# Patient Record
Sex: MALE | Race: WHITE
[De-identification: names, ages, dates, MRNs, and addresses within clinical notes are randomized per-mention and may not be internally consistent; named-entity substitution may affect disease eponyms.]

---

## 2023-08-21 ENCOUNTER — HOSPITAL ENCOUNTER (INPATIENT)
Dept: HOSPITAL 75 - ICU | Age: 48
LOS: 2 days | Discharge: HOME | DRG: 308 | End: 2023-08-23
Attending: INTERNAL MEDICINE | Admitting: INTERNAL MEDICINE
Payer: COMMERCIAL

## 2023-08-21 VITALS — BODY MASS INDEX: 29.99 KG/M2 | WEIGHT: 233.69 LBS | HEIGHT: 73.98 IN

## 2023-08-21 DIAGNOSIS — I11.0: ICD-10-CM

## 2023-08-21 DIAGNOSIS — Z79.01: ICD-10-CM

## 2023-08-21 DIAGNOSIS — I50.21: ICD-10-CM

## 2023-08-21 DIAGNOSIS — I27.20: ICD-10-CM

## 2023-08-21 DIAGNOSIS — I48.91: Primary | ICD-10-CM

## 2023-08-21 DIAGNOSIS — G47.33: ICD-10-CM

## 2023-08-21 PROCEDURE — 36415 COLL VENOUS BLD VENIPUNCTURE: CPT

## 2023-08-21 PROCEDURE — 93005 ELECTROCARDIOGRAM TRACING: CPT

## 2023-08-21 PROCEDURE — 87081 CULTURE SCREEN ONLY: CPT

## 2023-08-21 PROCEDURE — 84443 ASSAY THYROID STIM HORMONE: CPT

## 2023-08-21 PROCEDURE — 85027 COMPLETE CBC AUTOMATED: CPT

## 2023-08-21 PROCEDURE — 83735 ASSAY OF MAGNESIUM: CPT

## 2023-08-21 PROCEDURE — 80053 COMPREHEN METABOLIC PANEL: CPT

## 2023-08-21 PROCEDURE — 85025 COMPLETE CBC W/AUTO DIFF WBC: CPT

## 2023-08-21 PROCEDURE — 84100 ASSAY OF PHOSPHORUS: CPT

## 2023-08-21 PROCEDURE — 93306 TTE W/DOPPLER COMPLETE: CPT

## 2023-08-21 PROCEDURE — 80061 LIPID PANEL: CPT

## 2023-08-21 PROCEDURE — 71045 X-RAY EXAM CHEST 1 VIEW: CPT

## 2023-08-21 RX ADMIN — Medication SCH MLS/HR: at 15:30

## 2023-08-21 RX ADMIN — APIXABAN SCH MG: 5 TABLET, FILM COATED ORAL at 20:56

## 2023-08-21 RX ADMIN — SENNOSIDES SCH MG: 8.6 TABLET, FILM COATED ORAL at 19:26

## 2023-08-21 RX ADMIN — METOPROLOL TARTRATE SCH MG: 25 TABLET, FILM COATED ORAL at 20:56

## 2023-08-21 RX ADMIN — DOCUSATE SODIUM SCH MG: 100 CAPSULE ORAL at 19:26

## 2023-08-21 RX ADMIN — SODIUM CHLORIDE SCH MLS/HR: 900 INJECTION, SOLUTION INTRAVENOUS at 15:30

## 2023-08-21 NOTE — PROGRESS NOTE
DIONTE GLEZ 8/21/23 1448:


Subjective


Date Seen by a Provider:  Aug 21, 2023


Subjective/Events-last exam


Masoud Fragoso is a 48 year old male who came into the hospital today to get lab 

work and imaging done. He has recently begun having shortness of breath and 

friday went to his outpatient provider and was prescribed an albuterol inhaler. 

Over the weekend he continued to be short of breath so he had labs, an EKG, and 

xray ordered. He came into today to do these when he was found to have afib with

RVR. He denies symptoms of chest pain or palpitations and denies ongoing 

shortness of breath. He denies personal history of heart disease, stroke, drug 

use, hypertension, and diabetes. He admits to snoring when he sleeps. He does 

admit to a family history of heart disease in several family members.


Review of Systems


Pulmonary:  No Dyspnea


Cardiovascular:  No: Chest Pain, Palpitations, Edema


Negative except as mentioned in HPI.





Objective


Exam


Last Set of Vital Signs





Vital Signs








  Date Time  Temp Pulse Resp B/P (MAP) Pulse Ox O2 Delivery O2 Flow Rate FiO2


 


8/21/23 14:30  82 29 120/96 (104) 93 Room Air  





Capillary Refill :


General:  Alert, Cooperative, No Acute Distress


HEENT:  Atraumatic


Lungs:  Clear to Auscultation, Normal Air Movement


Heart:  Other (tachycardic, irregularly irregular rhythm)


Extremities:  No Edema, Normal Pulses


Neuro:  Normal Speech


Psych/Mental Status:  Mental Status NL, Mood NL





Assessment/Plan


Assessment/Plan


Assess & Plan/Chief Complaint


Assessment and Plan


-Atrial fibrillation with RVR


   > Order CMP for electrolyte levels (K, Mg)


   > Transthoracic echo to assess LA size, thrombus formation, LV function, 

valvular dysfunction


   > Continue rate control with diltiazem


      > If rate control with CCB is not sufficient consider cardioversion


   > Continue anticoagulation with apixaban


   > Repeat EKG


   > [id number] score of 0





TASHA MARIN DO 8/22/23 0455:


Subjective


Time Seen by a Provider:  18:00





Supervisory-Addendum Brief


Verification & Attestation


Participated in pt care:  history, MDM, physical


Personally performed:  exam, history, MDM, supervision of care


Care discussed with:  Medical Student


Procedures:  n/a


Results interpretation:  Verified all documentation


Verification and Attestation of Medical Student E/M Service





A medical student performed and documented this service in my presence. I 

reviewed and verified all information documented by the medical student and made

modifications to such information, when appropriate. I personally performed the 

physical exam and medical decision making. 





 Tasha Marin, Aug 22, 2023,04:54











DIONTE GLEZ                Aug 21, 2023 14:48


TASHA MARIN DO                Aug 22, 2023 04:55

## 2023-08-21 NOTE — CONSULTATION-CARDIOLOGY
HPI-Cardiology


Cardiology Consultation


Date of Consultation


8/21/23


Date of Admission





Time Seen by Provider:  17:23


Indication:  Atrial fibrillation





HPI


48-year-old gentleman with no significant past medical history starting to have 

increasing dyspnea for the past week, seen by Dr. Molina, started the work-up, he

was seen in the emergency room with atrial fibrillation rapid ventricular 

response, transferred over with persistent tachycardia.


On my evaluation he was feeling slightly better, still anxious.  Denied any 

chest pain.  No syncope or near syncopal episodes.  No claudications.





Home Medications & Allergies


Allergies:  


Coded Allergies:  


     No Known Drug Allergies (Unverified , 12/10/15)


Home Medication List Reviewed:  Yes


Did not take any medication at home





PMH-Social-Family Hx


Patient Social History


Marital Status:  


Employed/Student:  employed


Smoking Status:  Never a Smoker


Alcohol Use?:  Yes





Past Medical History


Discussed below





Family Medical History


Significant Family History:  No Pertinent Family Hx





Review of Systems-General


Review of Systems


Constitutional:  no symptoms reported, see HPI


EENTM:  see HPI, no symptoms reported


Respiratory:  see HPI; No cough; dyspnea on exertion; No hemoptysis, No 

orthopnea, No phlegm, No short of breath, No stridor, No wheezing, No other


Cardiovascular:  see HPI


Gastrointestinal:  no symptoms reported, see HPI


Genitourinary:  no symptoms reported, see HPI


Musculoskeletal:  no symptoms reported, see HPI


Skin:  no symptoms reported, see HPI


Psychiatric/Neurological:  No Symptoms Reported, See HPI





Physical Exam


Physical Exam


Vital Signs





Vital Signs - First Documented








 8/21/23 8/21/23





 12:45 13:45


 


Pulse 104 


 


Resp  29


 


B/P (MAP) 106/90 (95) 


 


Pulse Ox 99 


 


O2 Delivery Room Air 





Capillary Refill :


Height, Weight, BMI


Height: '"


Weight: lbs. oz. kg; 30.13 BMI


Method:


General Appearance:  No Apparent Distress, WD/WN


Eyes:  Bilateral Eye Normal Inspection, Bilateral Eye PERRL, Bilateral Eye EOMI


HEENT:  PERRL/EOMI, TMs Normal, Normal ENT Inspection, Pharynx Normal, Moist 

Mucous Membranes


Neck:  Full Range of Motion, Normal Inspection, Non Tender, Supple, Carotid 

Bruit


Respiratory:  Chest Non Tender, Normal Breath Sounds, No Accessory Muscle Use, 

No Respiratory Distress


Cardiovascular:  No Edema, No Gallop, No JVD, No Murmur, Normal Peripheral 

Pulses, Systolic Murmur, Irregularly Irregular, Tachycardia


Gastrointestinal:  Normal Bowel Sounds, No Organomegaly, No Pulsatile Mass, Non 

Tender, Soft


Back:  Normal Inspection, No CVA Tenderness, No Vertebral Tenderness


Extremity:  Normal Capillary Refill, Normal Inspection, Normal Range of Motion, 

Non Tender, No Calf Tenderness, No Pedal Edema


Neurologic/Psychiatric:  Alert, Oriented x3, No Motor/Sensory Deficits, Normal 

Mood/Affect


Skin:  Normal Color, Warm/Dry


Lymphatic:  No Adenopathy





A/P-Cardiology


Admission Diagnosis


Atrial fibrillation


Tachycardia


Congestive heart failure acute left ventricular systolic dysfunction


Hypertension





Assessment/Plan


Atrial fibrillation with rapid ventricular response


Unknown etiology


Started on Cardizem drip, still tachycardic


I will add digoxin and metoprolol


Currently on Lovenox


Planning for RALPH and electrical cardioversion if he did not convert on his own.





Congestive heart failure, acute left ventricular systolic dysfunction


2D echo was done on August 21, 2023 with dilated left ventricle diffuse 

hypokinesia with ejection fraction 40 to 45%, biatrial enlargement, pulmonary 

hypertension with PA pressure 45 to 50 mmHg


Underlying etiology is unknown probably due to atrial fibrillation


Adding beta-blockers and continue on Cardizem for now and monitor


I am planning to add losartan or Entresto depends on his blood pressure in 

addition to Jardiance once clinically stable





CHADVASC score 2, starting Eliquis 5 mg twice daily after the cardioversion





Hypertension, monitor blood pressure while on current medication





Mild to moderate consumption of alcohol, educated on avoiding alcohol





Pulmonary hypertension, PA pressure 45 to 50 mmHg, unknown etiology


Questionable underlying sleep apnea











MOIRA SUAREZ MD              Aug 21, 2023 17:27

## 2023-08-22 VITALS — DIASTOLIC BLOOD PRESSURE: 86 MMHG | SYSTOLIC BLOOD PRESSURE: 93 MMHG

## 2023-08-22 LAB
ALBUMIN SERPL-MCNC: 3.6 GM/DL (ref 3.2–4.5)
ALP SERPL-CCNC: 61 U/L (ref 40–136)
ALT SERPL-CCNC: 40 U/L (ref 0–55)
BASOPHILS # BLD AUTO: 0 10^3/UL (ref 0–0.1)
BASOPHILS NFR BLD AUTO: 1 % (ref 0–10)
BILIRUB SERPL-MCNC: 0.7 MG/DL (ref 0.1–1)
BUN/CREAT SERPL: 16
CALCIUM SERPL-MCNC: 8.4 MG/DL (ref 8.5–10.1)
CHLORIDE SERPL-SCNC: 108 MMOL/L (ref 98–107)
CHOLEST SERPL-MCNC: 148 MG/DL (ref ?–200)
CO2 SERPL-SCNC: 25 MMOL/L (ref 21–32)
CREAT SERPL-MCNC: 0.95 MG/DL (ref 0.6–1.3)
EOSINOPHIL # BLD AUTO: 0.1 10^3/UL (ref 0–0.3)
EOSINOPHIL NFR BLD AUTO: 1 % (ref 0–10)
GFR SERPLBLD BASED ON 1.73 SQ M-ARVRAT: 100 ML/MIN
GLUCOSE SERPL-MCNC: 96 MG/DL (ref 70–105)
HCT VFR BLD CALC: 41 % (ref 40–54)
HDLC SERPL-MCNC: 36 MG/DL (ref 40–60)
HGB BLD-MCNC: 13.2 G/DL (ref 13.3–17.7)
LYMPHOCYTES # BLD AUTO: 2 10^3/UL (ref 1–4)
LYMPHOCYTES NFR BLD AUTO: 28 % (ref 12–44)
MAGNESIUM SERPL-MCNC: 1.9 MG/DL (ref 1.6–2.4)
MANUAL DIFFERENTIAL PERFORMED BLD QL: NO
MCH RBC QN AUTO: 30 PG (ref 25–34)
MCHC RBC AUTO-ENTMCNC: 33 G/DL (ref 32–36)
MCV RBC AUTO: 91 FL (ref 80–99)
MONOCYTES # BLD AUTO: 0.7 10^3/UL (ref 0–1)
MONOCYTES NFR BLD AUTO: 10 % (ref 0–12)
NEUTROPHILS # BLD AUTO: 4.5 10^3/UL (ref 1.8–7.8)
NEUTROPHILS NFR BLD AUTO: 61 % (ref 42–75)
PHOSPHATE SERPL-MCNC: 3.6 MG/DL (ref 2.3–4.7)
PLATELET # BLD: 196 10^3/UL (ref 130–400)
PMV BLD AUTO: 10.3 FL (ref 9–12.2)
POTASSIUM SERPL-SCNC: 4.2 MMOL/L (ref 3.6–5)
PROT SERPL-MCNC: 5.8 GM/DL (ref 6.4–8.2)
SODIUM SERPL-SCNC: 141 MMOL/L (ref 135–145)
TRIGL SERPL-MCNC: 107 MG/DL (ref ?–150)
VLDLC SERPL CALC-MCNC: 21 MG/DL (ref 5–40)
WBC # BLD AUTO: 7.4 10^3/UL (ref 4.3–11)

## 2023-08-22 PROCEDURE — 5A2204Z RESTORATION OF CARDIAC RHYTHM, SINGLE: ICD-10-PCS | Performed by: INTERNAL MEDICINE

## 2023-08-22 RX ADMIN — EMPAGLIFLOZIN SCH MG: 10 TABLET, FILM COATED ORAL at 09:31

## 2023-08-22 RX ADMIN — POTASSIUM CHLORIDE SCH MEQ: 1500 TABLET, EXTENDED RELEASE ORAL at 05:13

## 2023-08-22 RX ADMIN — DOCUSATE SODIUM SCH MG: 100 CAPSULE ORAL at 09:00

## 2023-08-22 RX ADMIN — POTASSIUM CHLORIDE SCH MLS/HR: 200 INJECTION, SOLUTION INTRAVENOUS at 05:13

## 2023-08-22 RX ADMIN — METOPROLOL TARTRATE SCH MG: 25 TABLET, FILM COATED ORAL at 20:42

## 2023-08-22 RX ADMIN — SACUBITRIL AND VALSARTAN SCH TAB: 24; 26 TABLET, FILM COATED ORAL at 09:30

## 2023-08-22 RX ADMIN — MAGNESIUM SULFATE IN DEXTROSE SCH MLS/HR: 10 INJECTION, SOLUTION INTRAVENOUS at 05:36

## 2023-08-22 RX ADMIN — APIXABAN SCH MG: 5 TABLET, FILM COATED ORAL at 09:30

## 2023-08-22 RX ADMIN — Medication SCH MLS/HR: at 00:47

## 2023-08-22 RX ADMIN — SODIUM CHLORIDE SCH MLS/HR: 900 INJECTION, SOLUTION INTRAVENOUS at 05:12

## 2023-08-22 RX ADMIN — MAGNESIUM SULFATE IN DEXTROSE SCH MLS/HR: 10 INJECTION, SOLUTION INTRAVENOUS at 05:37

## 2023-08-22 RX ADMIN — SACUBITRIL AND VALSARTAN SCH TAB: 24; 26 TABLET, FILM COATED ORAL at 20:42

## 2023-08-22 RX ADMIN — SENNOSIDES SCH MG: 8.6 TABLET, FILM COATED ORAL at 20:44

## 2023-08-22 RX ADMIN — SENNOSIDES SCH MG: 8.6 TABLET, FILM COATED ORAL at 09:00

## 2023-08-22 RX ADMIN — DOCUSATE SODIUM SCH MG: 100 CAPSULE ORAL at 20:44

## 2023-08-22 RX ADMIN — MAGNESIUM SULFATE IN DEXTROSE SCH MLS/HR: 10 INJECTION, SOLUTION INTRAVENOUS at 05:14

## 2023-08-22 RX ADMIN — SODIUM CHLORIDE SCH MLS/HR: 900 INJECTION, SOLUTION INTRAVENOUS at 20:42

## 2023-08-22 RX ADMIN — DEXTROSE SCH MLS/HR: 5 SOLUTION INTRAVENOUS at 09:00

## 2023-08-22 RX ADMIN — APIXABAN SCH MG: 5 TABLET, FILM COATED ORAL at 20:42

## 2023-08-22 RX ADMIN — LORATADINE SCH MG: 10 TABLET ORAL at 09:30

## 2023-08-22 RX ADMIN — DEXTROSE SCH MLS/HR: 5 SOLUTION INTRAVENOUS at 16:12

## 2023-08-22 RX ADMIN — METOPROLOL TARTRATE SCH MG: 25 TABLET, FILM COATED ORAL at 09:30

## 2023-08-22 NOTE — PROGRESS NOTE
DIONTE GLEZ F 8/22/23 1337:


Subjective


Subjective/Events-last exam


Mr. Fragoso was seen this morning and he appears to be doing well. He said he feels

good. He denies chest pain, shortness of breath, palpitations.


Review of Systems


General:  No Chills, No Night Sweats, No Fatigue, No Malaise, No Appetite, No 

Other


HEENT:  No Head Aches, No Visual Changes, No Eye Pain, No Ear Pain, No 

Dysphasia, No Sinus Congestion, No Post Nasal Drip, No Sore Throat, No Other


Pulmonary:  No Dyspnea, No Cough, No Pleuritic Chest Pain


Cardiovascular:  No: Chest Pain, Palpitations


Negative except as mentioned in HPI.





Objective


Exam


Last Set of Vital Signs





Vital Signs








  Date Time  Temp Pulse Resp B/P (MAP) Pulse Ox O2 Delivery O2 Flow Rate FiO2


 


8/22/23 12:43  63      


 


8/22/23 12:00   20 97/85 (89) 90 Room Air  


 


8/22/23 12:00 36.2       


 


8/21/23 21:54        21





Capillary Refill : Less Than 3 Seconds


I&O











Intake and Output 


 


 8/22/23





 00:00


 


Intake Total 1600 ml


 


Output Total 850 ml


 


Balance 750 ml


 


 


 


Intake Oral 1600 ml


 


Output Urine Total 850 ml


 


Daily Weight Change No








General:  Alert


Neck:  Supple


Lungs:  Clear to Auscultation, Normal Air Movement


Heart:  Regular Rate, Normal S1, Normal S2, No Murmurs





Results


Lab


Laboratory Tests


8/22/23 03:33: 


White Blood Count 7.4, Red Blood Count 4.43, Hemoglobin 13.2L, Hematocrit 41, 

Mean Corpuscular Volume 91, Mean Corpuscular Hemoglobin 30, Mean Corpuscular 

Hemoglobin Concent 33, Red Cell Distribution Width 13.4, Platelet Count 196, 

Mean Platelet Volume 10.3, Immature Granulocyte % (Auto) 0, Neutrophils (%) 

(Auto) 61, Lymphocytes (%) (Auto) 28, Monocytes (%) (Auto) 10, Eosinophils (%) 

(Auto) 1, Basophils (%) (Auto) 1, Neutrophils # (Auto) 4.5, Lymphocytes # (Auto)

2.0, Monocytes # (Auto) 0.7, Eosinophils # (Auto) 0.1, Basophils # (Auto) 0.0, 

Immature Granulocyte # (Auto) 0.0, Sodium Level 141, Potassium Level 4.2, 

Chloride Level 108H, Carbon Dioxide Level 25, Anion Gap 8, Blood Urea Nitrogen 

15, Creatinine 0.95, Estimat Glomerular Filtration Rate 100, BUN/Creatinine 

Ratio 16, Glucose Level 96, Calcium Level 8.4L, Corrected Calcium 8.7, 

Phosphorus Level 3.6, Magnesium Level 1.9, Total Bilirubin 0.7, Aspartate Amino 

Transf (AST/SGOT) 22, Alanine Aminotransferase (ALT/SGPT) 40, Alkaline 

Phosphatase 61, Total Protein 5.8L, Albumin 3.6, Triglycerides Level 107, 

Cholesterol Level 148, LDL Cholesterol Direct 114, VLDL Cholesterol 21, HDL 

Cholesterol 36L, Thyroid Stimulating Hormone (TSH) 0.81





Microbiology


8/21/23 MRSA Screen - Final, Complete


          MRSA not isolated





Assessment/Plan


Assessment/Plan


Assess & Plan/Chief Complaint


Assessment and Plan


-Atrial fibrillation with RVR


   > Cardizem drip started yesterday but was still slightly tachycardic


   > Cardiology added digoxin and metoprolol


      > HR under control at 78 this morning


   > had 2D echo yesterday that showed EF of 40-45% with dilated LV, biatrial 

enlargement, pulmonary HTN with PA pressure 45-50 mmHg (likely hx of sleep 

apnea)


   > RALPH showed no signs of thrombus formation


   > cardioversion x2 unsuccessful


   > lopressor given, amiodarone drip planned


   > Continue anticoagulation


   > Consider possibly d/c cardizem drip due to normalized HR and hypotension





TASHA MARIN DO 8/22/23 1937:


Supervisory-Addendum Brief


Verification & Attestation


Participated in pt care:  history, MDM, physical


Personally performed:  exam, history, MDM, supervision of care


Care discussed with:  Medical Student


Procedures:  n/a


Results interpretation:  Verified all documentation


Verification and Attestation of Medical Student E/M Service





A medical student performed and documented this service in my presence. I 

reviewed and verified all information documented by the medical student and made

modifications to such information, when appropriate. I personally performed the 

physical exam and medical decision making. 





 Tasha Marin, Aug 22, 2023,19:37











DIONTE GLEZ                Aug 22, 2023 13:37


TASHA MARIN DO                Aug 22, 2023 19:37

## 2023-08-22 NOTE — PROGRESS NOTE - HOSPITALIST
DIONTE GLEZ 8/22/23 1210:


Subjective


HPI/CC On Admission


Late entry of H&P: student mistakenly created progress note instead of H&P. 

Patient was seen 8/21/23





CC: New on set AF RVR





HPI: This is a 48yoWM clinic patient of Dr Molina who has essentially no PMH and 

does not smoke or drink etoh who presented from [facility] for higher level of care 

due to new onset AF RVR. Cardizem drip initiated and OAC and Dr Schwarz plans to 

perform RALPH and cardiovert tomorrow. No sleep study performed in the past and I 

recommended that after DC. 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


Subjective/Events-last exam


Masoud Fragoso was seen this morning and appeared to be doing well. He said he 

feels good. Denies chest pain, shortness of breath, palpitations





Review of Systems


General:  No Chills, No Night Sweats, No Fatigue, No Malaise, No Appetite, No 

Other


HEENT:  No Head Aches, No Visual Changes, No Eye Pain, No Ear Pain, No 

Dysphasia, No Sinus Congestion, No Post Nasal Drip, No Sore Throat, No Other


Pulmonary:  No Dyspnea, No Pleuritic Chest Pain


Cardiovascular:  No: Chest Pain, Palpitations


Gastrointestinal:  No: Nausea, Vomiting, Abdominal Pain, Diarrhea, Constipation,

Melena, Hematochezia, Other


Genitourinary:  No Dysuria, No Frequency, No Incontinence, No Hematuria, No 

Retention, No Other


Musculoskeletal:  No: other, neck pain, shoulder pain, arm pain, back pain, hand

pain, leg pain, foot pain


Neurological:  No: Weakness, Numbness, Incoordination, Change in speech, 

Confusion, Seizures, Other


Negative except as mentioned in HPI.





Objective


Exam


Vital Signs





Vital Signs








  Date Time  Temp Pulse Resp B/P (MAP) Pulse Ox O2 Delivery O2 Flow Rate FiO2


 


8/22/23 11:00  74 9 124/103 (110) 97 Room Air  


 


8/22/23 08:00 36.3       


 


8/21/23 21:54        21





Capillary Refill : Less Than 3 Seconds


General Appearance:  No Apparent Distress, WD/WN


Neck:  Normal Inspection


Respiratory:  Lungs Clear, Normal Breath Sounds; No Respiratory Distress


Cardiovascular:  Regular Rate, Rhythm, No Murmur, Normal Peripheral Pulses





Results/Procedures


Lab


Laboratory Tests


8/22/23 03:33








Patient resulted labs reviewed.





Assessment/Plan


Assessment and Plan


Assess & Plan/Chief Complaint


Assessment and Plan


-Atrial fibrillation with RVR


   > started on cardizem drip yesterday, was still slightly tachycardic


   > cardiology added digoxin and metoprolol


   > tachycardia resolved today with HR 78


   > 2D echo yesterday showed EF of 40-45% with dilated LV, biatrial 

enlargement, pulmonary HTN with PA pressure 45-50 mmHg (questionable sleep 

apnea)


   > RALPH showed no thrombus formation


   > cardioversion x2 was unsuccessful


   > lopressor given and plan amiodarone drip


   > consider d/c cardizem and continue metoprolol since HR is controlled but 

patient BP is 93/86 this morning





WHEAT,JIM 8/22/23 1318:








DIONTE GLEZ                Aug 22, 2023 12:10


WHEAT,JIM                      Aug 22, 2023 13:18

## 2023-08-22 NOTE — ANESTHESIA-GENERAL POST-OP
MAC


Patient Condition


Mental Status/LOC:  Same as Preop


Cardiovascular:  Satisfactory


Nausea/Vomiting:  Absent


Respiratory:  Satisfactory


Pain:  Controlled


Complications:  Absent





Post Op Complications


Complications


None





Follow Up Care/Instructions


Patient Instructions


None needed.





Anesthesiology Discharge Order


Discharge Order


Patient is doing well, no complaints, stable vital signs, no apparent adverse 

anesthesia problems.   


No complications reported per nursing.











JANELL DEVI CRNA         Aug 22, 2023 08:48

## 2023-08-22 NOTE — CARDIOLOGY PROGRESS NOTE
Subjective


Date Seen by Provider:  Aug 22, 2023


Time Seen by Provider:  07:53


Subjective/Events-last exam


Patient was seen at bedside sitting comfortably.  No new complain


Review of Systems


General:  No Chills, No Night Sweats, No Fatigue, No Malaise, No Appetite, No 

Other


HEENT:  No Head Aches, No Visual Changes, No Eye Pain, No Ear Pain, No 

Dysphasia, No Sinus Congestion, No Post Nasal Drip, No Sore Throat, No Other


Pulmonary:  No Dyspnea, No Cough, No Pleuritic Chest Pain, No Other


Cardiovascular:  No: Chest Pain, Palpitations, Orthopnea, Paroxysmal Noc. 

Dyspnea, Edema, Lt Headedness, Other





Objective-Cardiology


Exam


Last Set of Vital Signs





Vital Signs








 8/21/23 8/21/23 8/22/23 8/22/23 8/22/23





 21:54 22:45 05:00 06:00 07:12


 


Temp  36.5   


 


Pulse     75


 


Resp   20  


 


B/P (MAP)    105/69 (81) 


 


Pulse Ox    90 


 


O2 Delivery    Room Air 


 


FiO2 21    








I&O











Intake and Output 


 


 8/22/23





 00:00


 


Intake Total 1600 ml


 


Output Total 850 ml


 


Balance 750 ml


 


 


 


Intake Oral 1600 ml


 


Output Urine Total 850 ml


 


Daily Weight Change No








General:  Alert, Cooperative, No Acute Distress


HEENT:  Atraumatic


Neck:  Supple, No JVD


Lungs:  Clear to Auscultation, Normal Air Movement


Heart:  Normal S1, Normal S2, Other (tachycardic, irregularly irregular rhythm)


Abdomen:  Normal Bowel Sounds


Extremities:  No Clubbing, No Cyanosis, No Edema, Normal Pulses


Skin:  No Rashes, No Breakdown


Neuro:  Normal Speech


Psych/Mental Status:  Mental Status NL, Mood NL





Results


Lab


Laboratory Tests


8/22/23 03:33














A/P-Cardiology


Admission Diagnosis


Atrial fibrillation


Tachycardia


Congestive heart failure acute left ventricular systolic dysfunction


Hypertension





Assessment/Plan


Atrial fibrillation with rapid ventricular response


Unknown etiology


Currently on Cardizem drip, had multiple episodes of bradycardia but did not 

convert overnight


Plan to proceed with RALPH and electrical cardioversion





Congestive heart failure, acute left ventricular systolic dysfunction


2D echo was done on August 21, 2023 with dilated left ventricle diffuse 

hypokinesia with ejection fraction 40 to 45%, biatrial enlargement, pulmonary 

hypertension with PA pressure 45 to 50 mmHg


Underlying etiology is unknown probably due to atrial fibrillation


Adding beta-blockers and continue on Cardizem for now and monitor


I am planning to add losartan or Entresto depends on his blood pressure in 

addition to Jardiance once clinically stable





CHADVASC score 2, starting Eliquis 5 mg twice daily after the cardioversion





Hypertension, monitor blood pressure while on current medication





Mild to moderate consumption of alcohol, educated on avoiding alcohol





Pulmonary hypertension, PA pressure 45 to 50 mmHg, unknown etiology


Questionable underlying sleep apnea











MOIRA SUAREZ MD              Aug 22, 2023 07:54

## 2023-08-22 NOTE — HISTORY & PHYSICAL
History of Present Illness


HPI/Chief Complaint


Late entry of H&P: student mistakenly created progress note instead of H&P. 

Patient was seen 8/21/23





CC: New on set AF RVR





HPI: This is a 48yoWM clinic patient of Dr Molina who has essentially no PMH and 

does not smoke or drink etoh who presented from [facility] for higher level of care 

due to new onset AF RVR. Cardizem drip initiated and OAC and Dr Schwarz plans to 

perform AMAYA and cardiovert tomorrow. No sleep study performed in the past and I 

recommended that after DC. 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


Source:  patient, family, RN/MD, old records


Exam Limitations:  no limitations


Date Seen


8/22/23


Time Seen by a Provider:  19:00


Attending Physician


Kirill Eason MD


PCP


Admitting Physician:


Tasha Marin DO 








Attending Physician:


Tasha Marin DO


Referring Physician





Date of Admission


Aug 21, 2023 at 12:40





Home Medications & Allergies


Home Medications


Reviewed patient Home Medication Reconciliation performed by pharmacy medication

reconciliations technician and/or nursing.


Patients Allergies have been reviewed.





Allergies





Allergies


Coded Allergies


  No Known Drug Allergies ([id number]/15)








Past Medical-Social-Family Hx


Past Med/Social Hx:  Reviewed Nursing Past Med/Soc Hx, Reviewed and Corrections 

made


Patient Social History


Marrital Status:  


Employed/Student:  employed


Smoking Status:  Never a Smoker





Past Medical History


Cardiac:  Atrial Fibrillation (8/21/23)





Family History


No Pertinent Family Hx





Review of Systems


Constitutional:  see HPI


Respiratory:  dyspnea on exertion, short of breath


Cardiovascular:  palpitations





Physical Exam


Physical Exam


Vital Signs





Vital Signs - First Documented








 8/21/23 8/21/23 8/21/23 8/21/23





 12:45 13:45 19:25 21:54


 


Temp   36.6 


 


Pulse 104   


 


Resp  29  


 


B/P (MAP) 106/90 (95)   


 


Pulse Ox 99   


 


O2 Delivery Room Air   


 


FiO2    21





Capillary Refill : Less Than 3 Seconds


Height, Weight, BMI


Height: '"


Weight: lbs. oz. kg; 30.13 BMI


Method:


General Appearance:  No Apparent Distress, WD/WN


Eyes:  Bilateral Eye Normal Inspection, Bilateral Eye PERRL, Bilateral Eye EOMI


HEENT:  PERRL/EOMI, TMs Normal, Normal ENT Inspection, Pharynx Normal, Moist 

Mucous Membranes


Neck:  Full Range of Motion, Normal Inspection, Non Tender, Supple, Carotid B

ruit


Respiratory:  Chest Non Tender, Normal Breath Sounds, No Accessory Muscle Use, 

No Respiratory Distress


Cardiovascular:  No Edema, No Gallop, No JVD, No Murmur, Normal Peripheral 

Pulses, Systolic Murmur, Irregularly Irregular, Tachycardia


Gastrointestinal:  Normal Bowel Sounds, No Organomegaly, No Pulsatile Mass, Non 

Tender, Soft


Back:  Normal Inspection, No CVA Tenderness, No Vertebral Tenderness


Extremity:  Normal Capillary Refill, Normal Inspection, Normal Range of Motion, 

Non Tender, No Calf Tenderness, No Pedal Edema


Neurologic/Psychiatric:  Alert, Oriented x3, No Motor/Sensory Deficits, Normal 

Mood/Affect


Skin:  Normal Color, Warm/Dry


Lymphatic:  No Adenopathy





Results


Results/Procedures


Labs


Laboratory Tests


8/22/23 03:33








Patient resulted labs reviewed.





Assessment/Plan


Admission Diagnosis


Assessment:


New onset AF RVR


Suspected sleep study





Plan:


Cardizem drip


Cardioversion after AMAYA tomorrow


OAC


Admission Status:  Inpatient Order (span 2 midnights)


Reason for Inpatient Admission:  


amaya af rvr











TASHA MARIN DO                Aug 22, 2023 04:59

## 2023-08-22 NOTE — TELE-ICU PROGRESS NOTE
Subjective


Date Seen by a Provider:  Aug 22, 2023


Time Seen by a Provider:  10:37


Subjective/Events-last exam





(Tele-ICU Physician ,   consultation as per request of PCP 


Service provided via interactive audio and video telecommunications  E-CARE 

system to a patient admitted to ICU bed in [facility].








Available chart/ vitals / labs / Images reviewed


H&P is from ER notes


Patient's information available about PMH, Shx, Fhx   allergy reviewed inEMR.


ROS as per chart and RN report





Now in ICU, hemodynamically stable


Video assessment done using   teleICU camera, rest of exam as per RN


Discussed with RN.








Hospital course:


(8/21) 48M Admitted as transfer from [place] ER for afib RVR-digoxin/cardizem


(8/22) Did not convert overnight. RALPH shoed no clot or thrombus. Cardioversion 

done-unsuccessful x2. Lopressor given. Plan for amio drip








A/P


Afin RVR - new 


Unknown etiology


-on Cardizem drip,


- 8/22 RALPH  no  thrombus, EF 45 % 


-s/p atterempted Cardioversion -unsuccessful x2. 


- amio drip as per cards ordered 


- ac -Lovenox








Acute left ventricular systolic dysfunction


- as per cards 





Pulmonary hypertension, P


A pressure 45 to 50 mmHg on previous echo 


-  unknown etiology, needs f/up and additional w/up - NEED CTA TO R?O CHRONIC VS

ACUTE PE ? - as per bedside MD 








VTE Prophylaxis: lov


Stress Ulcer Prophylaxis: na








Plans in collaboration with   bedside consultants and IM MDs.


Discussed with RN to reach out if any questions or concerns


A total of  22 minutes of critical care time was devoted to this patient today, 

required to treat and/or prevent further deterioration of  critical care 

condition ( as above ) .





I am remotely monitoring this patient from another state.  I am unable to do the

bedside exam, and history/physical and pertinent information is taken from other

notes in the computer and bedside staff. .











Sepsis Event


Evaluation


Height, Weight, BMI


Height: '"


Weight: lbs. oz. kg; 30.47 BMI


Method:





Exam


Exam


Patient acknowledged, consented, and participated in this virtual visit which 

was conducted using real time audio/video


Vital Signs








  Date Time  Temp Pulse Resp B/P (MAP) Pulse Ox O2 Delivery O2 Flow Rate FiO2


 


8/22/23 10:00  74 37 137/123 (128) 96 Room Air  


 


8/22/23 09:45  73 39 138/122 (127) 93 Room Air  


 


8/22/23 09:30  80 20 132/117 (122) 96 Room Air  


 


8/22/23 09:15  79 12 134/105 (115) 92 Room Air  


 


8/22/23 09:01  78  93/86    


 


8/22/23 09:00  82 12 131/107 (115) 93 Room Air  


 


8/22/23 08:30  78 15 93/86 (88) 93 Room Air  


 


8/22/23 08:20  73 37 121/84 (96) 94 Room Air  


 


8/22/23 08:15  84 24 122/87 (99) 98 Room Air  


 


8/22/23 08:10  68 16 127/76 (93) 99 Room Air  


 


8/22/23 08:05  73 31 114/83 (93) 99 Room Air  


 


8/22/23 08:00  87 19 127/76 (93) 100 Room Air  


 


8/22/23 08:00 36.3       


 


8/22/23 07:12  75      


 


8/22/23 07:00  77 21 127/89 (102) 93 Room Air  


 


8/22/23 06:00  76  105/69 (81) 90 Room Air  


 


8/22/23 05:00  65 20 120/96 (104) 89 Room Air  


 


8/22/23 04:00  69 26 127/89 (102) 89 Room Air  


 


8/22/23 03:00  61  111/82 (92) 92 Room Air  


 


8/22/23 03:00     91 Room Air  


 


8/22/23 02:00  60  105/79 (88) 89 Room Air  


 


8/22/23 01:00  40      


 


8/22/23 01:00  67  110/85 (93) 92 Room Air  


 


8/22/23 00:47  66  111/84    


 


8/22/23 00:00  72 35 113/90 (98) 94 Room Air  


 


8/21/23 23:15  65  111/87 (95) 89 Room Air  


 


8/21/23 23:00     96 Room Air  


 


8/21/23 22:45 36.5 60 22 106/89 (95) 95 Room Air  


 


8/21/23 22:00  68  120/86 (97) 93 Room Air  


 


8/21/23 21:54     95 Room Air  21


 


8/21/23 21:15  74 23 127/88 (101) 94 Room Air  


 


8/21/23 20:00  79 39 126/96 (106) 95 Room Air  


 


8/21/23 19:39 36.6       


 


8/21/23 19:33        


 


8/21/23 19:25 36.6       


 


8/21/23 19:20     95 Room Air  


 


8/21/23 19:00  65 20 111/89 (96) 95 Room Air  


 


8/21/23 19:00  75      


 


8/21/23 18:00  101 16 120/93 (102) 94 Room Air  


 


8/21/23 17:00  111 31 133/107 (116) 95 Room Air  


 


8/21/23 16:00  96 18 112/92 (99) 91 Room Air  


 


8/21/23 15:46     98 Room Air  


 


8/21/23 15:30  110      


 


8/21/23 15:00  94 11 124/91 (102) 95 Room Air  


 


8/21/23 14:45  99 44 124/97 (106) 95 Room Air  


 


8/21/23 14:30  82 29 120/96 (104) 93 Room Air  


 


8/21/23 14:15  109 22 111/95 (100) 94 Room Air  


 


8/21/23 14:00  98 15 109/94 (99) 91 Room Air  


 


8/21/23 13:45  94 29 115/94 (101) 93 Room Air  


 


8/21/23 13:30  104  113/93 (100) 90 Room Air  


 


8/21/23 13:15  97  118/100 (106) 96 Room Air  


 


8/21/23 13:00  102  112/105 (107) 91 Room Air  


 


8/21/23 12:58     98 Room Air  


 


8/21/23 12:45  89  106/90 (95) 99 Room Air  


 


8/21/23 12:45  104      














I & O 


 


 8/22/23





 06:59


 


Intake Total 3125 ml


 


Output Total 3000 ml


 


Balance 125 ml








Height & Weight


Height: '"


Weight: lbs. oz. kg; 30.47 BMI


Method:


General Appearance:  No Apparent Distress, WD/WN


HEENT:  PERRL/EOMI, TMs Normal, Normal ENT Inspection, Pharynx Normal, Moist 

Mucous Membranes


Neck:  Full Range of Motion, Normal Inspection, Non Tender, Supple, Carotid 

Bruit


Respiratory:  Chest Non Tender, Normal Breath Sounds, No Accessory Muscle Use, 

No Respiratory Distress


Cardiovascular:  No Edema, No Gallop, No JVD, No Murmur, Normal Peripheral 

Pulses, Systolic Murmur, Irregularly Irregular, Tachycardia


Capillary Refill:  Less Than 3 Seconds


Extremity:  Normal Capillary Refill, Normal Inspection, Normal Range of Motion, 

Non Tender, No Calf Tenderness, No Pedal Edema


Neurologic/Psychiatric:  Alert, Oriented x3, No Motor/Sensory Deficits, Normal 

Mood/Affect


Skin:  Normal Color, Warm/Dry


Lymphatic:  No Adenopathy





Results


Lab


Laboratory Tests


8/22/23 03:33











Assessment/Plan


Assessment/Plan


1











PILAR BRANTLEY MD         Aug 22, 2023 10:37

## 2023-08-22 NOTE — CARDIAC PROCEDURE NOTE-CS/ASA
Pre-Procedure Note


Pre-Op Procedure Note


Date of Available H&P:  Aug 22, 2023


Date H&P Reviewed:  Aug 22, 2023


Time H&P Reviewed:  07:54


History & Physical:  H&P Reviewed, Patient Examed, No changes noted


Pre-Operative Diagnosis:  Atrial fibrillation





Moderate Sedation PreProcedure


Time


07:54





ASA Score


3














Airway 


 


Lungs 


 


Heart 


 


 ASA score


 


 ASA 1: a normal healthy patient


 


 ASA 2:  a patient with a mild systemic disease (mid diabetes, controlled 

hypertension, obesity 


 


 ASA 3:  a patient with a severe systemic disease that limits activity  (angina,

COPD, prior Myocardial infarction)


 


 ASA 4:  a patient with an incapacitating disease that is a constant threat to 

life (CHF, renal failure)


 


 ASA 5:  a moribund patient not expected to survive 24 hrs.  (ruptured aneurysm)


 


 ASA 6:  a declared brain-dead patient whose organs are being harvested.


 


 For emergent operations, add the letter E after the classification











Mallampati Classification


Grade 3





Sedation Plan


Analgesia, Amnesia, Plan communicated to team members, Discussed options with 

patient/fam, Discussed risks with patient/fam


The patient is an appropriate candidate to undergo the planned procedure, 

sedation, and anesthesia.





The patient immediately re-assessed prior to indication.











MOIRA SUAREZ MD              Aug 22, 2023 07:55

## 2023-08-22 NOTE — CARDIOVERSION
Cardioversion


PROCEDURE PHYSICIAN:   Moira Schwarz 


 


DATE OF PROCEDURE:  8/22/23 


 


DIRECT EXTERNAL ELECTRICAL CARDIOVERSION:  


 


Indications:


Atrial Fibrillation with rapid ventricular rate





Preoperative diagnoses: 


Atrial Fibrillation with rapid ventricular rate


 


Postoperative diagnosis: 


Atrial fibrillation





History:


48 years old gentleman admitted with atrial fibrillation rapid ventricular 

response, started on Cardizem drip and received digoxin, did not convert overnig

ht, underwent RALPH showed no clot or thrombus, we decided to proceed with 

electrical cardioversion





Anesthesia:


By Anesthesia services





Complications: 


None





Specimen: 


None





Contrast: 0





Flouroscopy: none





Procedure Details:


 


The patient was brought the cath lab after informed consent was taken, all the 

risks and complications were explained including the risk of stroke. Electrical 

cardioversion was carried out with anesthesia support with propofol. 200 joules 

of synchronized shock was  delivered through external patches twice without 

success in terminating atrial fibrillation





Conclusions:


Failed 2 attempts for electrical cardioversion


Patient will be loaded with amiodarone then will attempt again in the morning














MOIRA SCHWARZ MD              Aug 22, 2023 08:08

## 2023-08-22 NOTE — DIAGNOSTIC IMAGING REPORT
EXAMINATION: Chest 1 view



HISTORY: Pleural effusion



COMPARISON: None available.



FINDINGS:



There is mild asymmetric right-sided edema. No pneumothorax. No

pleural effusion. Heart is upper limits of normal in size.



IMPRESSION:



1. Mild asymmetric right-sided edema.



Dictated by: 



  Dictated on workstation # [id number]

## 2023-08-23 LAB
ALBUMIN SERPL-MCNC: 3.8 GM/DL (ref 3.2–4.5)
ALP SERPL-CCNC: 64 U/L (ref 40–136)
ALT SERPL-CCNC: 32 U/L (ref 0–55)
BASOPHILS # BLD AUTO: 0 10^3/UL (ref 0–0.1)
BASOPHILS NFR BLD AUTO: 1 % (ref 0–10)
BILIRUB SERPL-MCNC: 0.7 MG/DL (ref 0.1–1)
BUN/CREAT SERPL: 14
CALCIUM SERPL-MCNC: 8.7 MG/DL (ref 8.5–10.1)
CHLORIDE SERPL-SCNC: 109 MMOL/L (ref 98–107)
CO2 SERPL-SCNC: 24 MMOL/L (ref 21–32)
CREAT SERPL-MCNC: 1.01 MG/DL (ref 0.6–1.3)
EOSINOPHIL # BLD AUTO: 0.1 10^3/UL (ref 0–0.3)
EOSINOPHIL NFR BLD AUTO: 1 % (ref 0–10)
GFR SERPLBLD BASED ON 1.73 SQ M-ARVRAT: 93 ML/MIN
GLUCOSE SERPL-MCNC: 93 MG/DL (ref 70–105)
HCT VFR BLD CALC: 44 % (ref 40–54)
HGB BLD-MCNC: 14.4 G/DL (ref 13.3–17.7)
LYMPHOCYTES # BLD AUTO: 2 10^3/UL (ref 1–4)
LYMPHOCYTES NFR BLD AUTO: 24 % (ref 12–44)
MAGNESIUM SERPL-MCNC: 2.1 MG/DL (ref 1.6–2.4)
MANUAL DIFFERENTIAL PERFORMED BLD QL: NO
MCH RBC QN AUTO: 30 PG (ref 25–34)
MCHC RBC AUTO-ENTMCNC: 33 G/DL (ref 32–36)
MCV RBC AUTO: 91 FL (ref 80–99)
MONOCYTES # BLD AUTO: 0.9 10^3/UL (ref 0–1)
MONOCYTES NFR BLD AUTO: 11 % (ref 0–12)
NEUTROPHILS # BLD AUTO: 5.2 10^3/UL (ref 1.8–7.8)
NEUTROPHILS NFR BLD AUTO: 64 % (ref 42–75)
PHOSPHATE SERPL-MCNC: 3.7 MG/DL (ref 2.3–4.7)
PLATELET # BLD: 180 10^3/UL (ref 130–400)
PMV BLD AUTO: 10.7 FL (ref 9–12.2)
POTASSIUM SERPL-SCNC: 4 MMOL/L (ref 3.6–5)
PROT SERPL-MCNC: 6.4 GM/DL (ref 6.4–8.2)
SODIUM SERPL-SCNC: 142 MMOL/L (ref 135–145)
WBC # BLD AUTO: 8.2 10^3/UL (ref 4.3–11)

## 2023-08-23 PROCEDURE — 5A2204Z RESTORATION OF CARDIAC RHYTHM, SINGLE: ICD-10-PCS | Performed by: INTERNAL MEDICINE

## 2023-08-23 RX ADMIN — APIXABAN SCH MG: 5 TABLET, FILM COATED ORAL at 10:12

## 2023-08-23 RX ADMIN — DOCUSATE SODIUM SCH MG: 100 CAPSULE ORAL at 10:08

## 2023-08-23 RX ADMIN — POTASSIUM CHLORIDE SCH MEQ: 1500 TABLET, EXTENDED RELEASE ORAL at 05:47

## 2023-08-23 RX ADMIN — SENNOSIDES SCH MG: 8.6 TABLET, FILM COATED ORAL at 10:08

## 2023-08-23 RX ADMIN — SACUBITRIL AND VALSARTAN SCH TAB: 24; 26 TABLET, FILM COATED ORAL at 10:09

## 2023-08-23 RX ADMIN — METOPROLOL TARTRATE SCH MG: 25 TABLET, FILM COATED ORAL at 10:12

## 2023-08-23 RX ADMIN — LORATADINE SCH MG: 10 TABLET ORAL at 10:15

## 2023-08-23 RX ADMIN — DEXTROSE SCH MLS/HR: 5 SOLUTION INTRAVENOUS at 07:59

## 2023-08-23 RX ADMIN — EMPAGLIFLOZIN SCH MG: 10 TABLET, FILM COATED ORAL at 10:14

## 2023-08-23 RX ADMIN — POTASSIUM CHLORIDE SCH MLS/HR: 200 INJECTION, SOLUTION INTRAVENOUS at 05:47

## 2023-08-23 RX ADMIN — MAGNESIUM SULFATE IN DEXTROSE SCH MLS/HR: 10 INJECTION, SOLUTION INTRAVENOUS at 05:47

## 2023-08-23 NOTE — CARDIOVERSION
Cardioversion


PROCEDURE PHYSICIAN:   Moira Schwarz 


 


DATE OF PROCEDURE:  8/23/23 


 


DIRECT EXTERNAL ELECTRICAL CARDIOVERSION:  


 


Indications:


Atrial Fibrillation with rapid ventricular rate





Preoperative diagnoses: 


Atrial Fibrillation with rapid ventricular rate


 


Postoperative diagnosis: 


Atrial fibrillation





History:


Patient underwent RALPH and cardioversion on August 22, 2023 which has failed and 

restoring sinus rhythm.  I bolused him and loaded him with amiodarone.  I d

ecided to proceed with another attempt for cardioversion





Anesthesia:


By Anesthesia services





Complications: 


None





Specimen: 


None





Contrast: 0





Flouroscopy: none





Procedure Details:


 


The patient was brought the cath lab after informed consent was taken, all the 

risks and complications were explained including the risk of stroke. Electrical 

cardioversion was carried out with anesthesia support with propofol. 200 joules 

of synchronized shock was  delivered through external patches which failed again

and restoring sinus rhythm





Conclusions:


Failed 2 attempts for cardioversion after loading with amiodarone


I am planning to continue with oral amiodarone and Cardizem, discharge the 

patient and follow-up as an outpatient and we can schedule another attempt for c

ardioversion after full load in 1 month














MOIRA SCHWARZ MD              Aug 23, 2023 08:26

## 2023-08-23 NOTE — TELE-ICU PROGRESS NOTE
Subjective


Date Seen by a Provider:  Aug 23, 2023


Time Seen by a Provider:  10:38


Subjective/Events-last exam








WILL SIGN OFF 


CONTINUE TO MONITOR AS PER USUAL  TELE-ICU PROTOCOL WHILE LOCATED IN ICU








Hospital course:


(8/21) 48M Admitted as transfer from [place] ER for afib RVR-digoxin/cardizem


(8/22) Did not convert overnight. RALPH shoed no clot or thrombus. Cardioversion 

done-unsuccessful x2. Lopressor given. Plan for amio drip


(8-23)  s/p Failed Cardioversiobn today - PO Amiodarone & Cardizem.





A/P


Afin RVR - new 


Unknown etiology


-on Cardizem drip,


- 8/22 RALPH  no  thrombus, EF 45 % 


-s/p atterempted Cardioversion -unsuccessful x2 days 


 as per cards ordered 


- ac -Lovenox- to NOAC 








Acute left ventricular systolic dysfunction


- as per cards 





Pulmonary hypertension, P


A pressure 45 to 50 mmHg on previous echo 


-  unknown etiology, needs f/up and additional w/up - NEED CTA TO R?O CHRONIC VS

ACUTE PE ? - as per bedside MD 








VTE Prophylaxis: lov


Stress Ulcer Prophylaxis: na








Plans in collaboration with   bedside consultants and IM MDs.


Discussed with RN to reach out if any questions or concerns


A total of  5 minutes of critical care time was devoted to this patient today, 

required to treat and/or prevent further deterioration of  critical care 

condition ( as above ) .





I am remotely monitoring this patient from another state.  I am unable to do the

bedside exam, and history/physical and pertinent information is taken from other

notes in the computer and bedside staff. .











Sepsis Event


Evaluation


Height, Weight, BMI


Height: '"


Weight: lbs. oz. kg; 30.02 BMI


Method:





Exam


Exam


Patient acknowledged, consented, and participated in this virtual visit which 

was conducted using real time audio/video


Vital Signs








  Date Time  Temp Pulse Resp B/P (MAP) Pulse Ox O2 Delivery O2 Flow Rate FiO2


 


8/23/23 10:00  90 34 146/97 (113) 92 Room Air  


 


8/23/23 09:15  79 23 115/94 (101) 94 Room Air  


 


8/23/23 09:00  69 20 127/86 (100) 95 Room Air  


 


8/23/23 08:45  71 14 133/100 (111) 100 Room Air  


 


8/23/23 08:30  72 15 121/87 (98) 93 Room Air  


 


8/23/23 08:25  68 13 124/94 (104) 97 Room Air  


 


8/23/23 08:00  77 13 110/98 (102) 93 Room Air  


 


8/23/23 07:15  74      


 


8/23/23 07:00  75 24 143/101 (115) 96 Room Air  


 


8/23/23 06:00  65 16 113/80 (91) 91 Room Air  


 


8/23/23 05:00  60 12 135/92 (106) 98 Room Air  


 


8/23/23 04:05     93 Room Air  


 


8/23/23 04:00 36.4 78 28 126/93 (106) 92 Room Air  


 


8/23/23 03:00  67 14 110/91 (97) 93 Room Air  


 


8/23/23 02:00  65 13 108/85 (93) 90 Room Air  


 


8/23/23 01:00  61      


 


8/23/23 01:00  61 28 124/92 (103) 90 Room Air  


 


8/23/23 00:05     92 Room Air  


 


8/23/23 00:03 36.4       


 


8/23/23 00:00  75 20 127/95 (106) 95 Room Air  


 


8/22/23 23:00  73 17 119/97 (104) 93 Room Air  


 


8/22/23 22:00  78 21 138/90 (106) 95 Room Air  


 


8/22/23 21:45  80 21 143/97 (109) 96 Room Air  


 


8/22/23 21:30  83 24 130/98 (107) 96 Room Air  


 


8/22/23 21:15  72 25 130/94 (106) 96 Room Air  


 


8/22/23 21:00  94 15  95 Room Air  


 


8/22/23 20:45  82 22 135/98 (107)  Room Air  


 


8/22/23 20:30  68 19 125/96 (106) 94 Room Air  


 


8/22/23 20:15   40 126/96 (106) 95 Room Air  


 


8/22/23 20:05     88 Room Air  


 


8/22/23 20:00 36.7 80 21 128/99 (111) 96 Room Air  


 


8/22/23 19:45   38 129/77 (97) 92 Room Air  


 


8/22/23 19:30   13 131/89 (102) 96 Room Air  


 


8/22/23 19:15  79 13 137/100 (117) 92 Room Air  


 


8/22/23 19:07      Room Air  


 


8/22/23 19:00  89      


 


8/22/23 19:00   50 130/106 (117) 95 Room Air  


 


8/22/23 18:00  83 31 125/110 (115) 96 Room Air  


 


8/22/23 17:00  62 20 151/105 (120) 97 Room Air  


 


8/22/23 16:00  72 32 130/105 (113) 97 Room Air  


 


8/22/23 16:00 36.3       


 


8/22/23 16:00     91 Room Air  


 


8/22/23 15:00  78 32 113/99 (104) 93 Room Air  


 


8/22/23 14:00  66 13 113/81 (92) 95 Room Air  


 


8/22/23 13:00  77 30 127/90 (102) 96 Room Air  


 


8/22/23 12:43  63      


 


8/22/23 12:00     91 Room Air  


 


8/22/23 12:00  65 20 97/85 (89) 90 Room Air  


 


8/22/23 12:00 36.2       


 


8/22/23 11:00  74 9 124/103 (110) 97 Room Air  














I & O 


 


 8/23/23





 07:00


 


Intake Total 2700 ml


 


Output Total 5475 ml


 


Balance -2775 ml








Height & Weight


Height: '"


Weight: lbs. oz. kg; 30.02 BMI


Method:


General Appearance:  No Apparent Distress, WD/WN


HEENT:  PERRL/EOMI, TMs Normal, Normal ENT Inspection, Pharynx Normal, Moist 

Mucous Membranes


Neck:  Normal Inspection


Respiratory:  Lungs Clear, Normal Breath Sounds; No Respiratory Distress


Cardiovascular:  Regular Rate, Rhythm, No Murmur, Normal Peripheral Pulses


Capillary Refill:  Less Than 3 Seconds


Extremity:  Normal Capillary Refill, Normal Inspection, Normal Range of Motion, 

Non Tender, No Calf Tenderness, No Pedal Edema


Neurologic/Psychiatric:  Alert, Oriented x3, No Motor/Sensory Deficits, Normal 

Mood/Affect


Skin:  Normal Color, Warm/Dry


Lymphatic:  No Adenopathy





Results


Lab


Laboratory Tests


8/22/23 03:33








8/23/23 03:50











Assessment/Plan


Assessment/Plan


1











PILAR BRANTLEY MD         Aug 23, 2023 10:38

## 2023-08-23 NOTE — CARDIOLOGY PROGRESS NOTE
Subjective


Date Seen by Provider:  Aug 23, 2023


Time Seen by Provider:  08:26


Subjective/Events-last exam


Patient was laying down in bed, no new complaint


Review of Systems


General:  No Chills, No Night Sweats, No Fatigue, No Malaise, No Appetite, No 

Other


HEENT:  No Head Aches, No Visual Changes, No Eye Pain, No Ear Pain, No 

Dysphasia, No Sinus Congestion, No Post Nasal Drip, No Sore Throat, No Other


Pulmonary:  No Dyspnea, No Cough, No Pleuritic Chest Pain, No Other


Cardiovascular:  No: Chest Pain, Palpitations, Orthopnea, Paroxysmal Noc. 

Dyspnea, Edema, Lt Headedness, Other





Objective-Cardiology


Exam


Last Set of Vital Signs





Vital Signs








 8/21/23 8/23/23 8/23/23 8/23/23





 21:54 04:00 06:00 07:15


 


Temp  36.4  


 


Pulse    74


 


Resp   16 


 


B/P (MAP)   113/80 (91) 


 


Pulse Ox   91 


 


O2 Delivery   Room Air 


 


FiO2 21   








I&O











Intake and Output 


 


 8/23/23





 00:00


 


Intake Total 4225 ml


 


Output Total 5525 ml


 


Balance -1300 ml


 


 


 


Intake Oral 2100 ml


 


IV Total 2125 ml


 


Output Urine Total 5525 ml


 


# Voids 3


 


# Bowel Movements 1








General:  Alert, Oriented X3, Cooperative


HEENT:  Atraumatic


Neck:  Supple


Lungs:  Clear to Auscultation, Normal Air Movement


Heart:  Regular Rate, Normal S1, Normal S2, No Murmurs


Abdomen:  Normal Bowel Sounds


Extremities:  No Clubbing, No Cyanosis, No Edema, Normal Pulses


Skin:  No Rashes, No Breakdown


Neuro:  Normal Speech


Psych/Mental Status:  Mental Status NL, Mood NL





Results


Lab


Laboratory Tests


8/23/23 03:50














A/P-Cardiology


Admission Diagnosis


Atrial fibrillation


Tachycardia


Congestive heart failure acute left ventricular systolic dysfunction


Hypertension





Assessment/Plan


Atrial fibrillation with rapid ventricular response


Unknown etiology


Failed attempt for cardioversion on August 22, 2023 and a second attempt on 

August 23, 2023


I will continue with oral amiodarone and Cardizem


Planning for discharge home today and follow-up as an outpatient and proceed 

with another attempt after full load with oral amiodarone in 1 month





Congestive heart failure, acute left ventricular systolic dysfunction


2D echo was done on August 21, 2023 with dilated left ventricle diffuse 

hypokinesia with ejection fraction 40 to 45%, biatrial enlargement, pulmonary 

hypertension with PA pressure 45 to 50 mmHg


Underlying etiology is unknown probably due to atrial fibrillation


Continue on beta-blockers and Entresto and Jardiance





CHADVASC score 2, starting Eliquis 5 mg twice daily after the cardioversion





Hypertension, monitor blood pressure while on current medication





Mild to moderate consumption of alcohol, educated on avoiding alcohol





Pulmonary hypertension, PA pressure 45 to 50 mmHg, unknown etiology


Questionable underlying sleep apnea











MOIRA SUAREZ MD              Aug 23, 2023 08:28

## 2023-08-23 NOTE — ANESTHESIA-GENERAL POST-OP
MAC


Patient Condition


Mental Status/LOC:  Same as Preop


Cardiovascular:  Satisfactory


Nausea/Vomiting:  Absent


Respiratory:  Satisfactory


Pain:  Controlled


Complications:  Absent





Post Op Complications


Complications


None





Follow Up Care/Instructions


Patient Instructions


None needed.





Anesthesiology Discharge Order


Discharge Order


Patient is doing well, no complaints, stable vital signs, no apparent adverse 

anesthesia problems.   


No complications reported per nursing.











DANIEL DESAI CRNA          Aug 23, 2023 08:32

## 2023-08-23 NOTE — PROGRESS NOTE
DIONTE GLEZ 8/23/23 1302:


Progress Note


Masoud Fragoso is a 48 year old male with no significant past medical history. He

had been having dyspnea for the past week and was initially worked up by Dr. Molina. Over this past weekend he went to [place] to get labs and an EKG done and 

was found to be in atrial fibrillation with RVR and he was transferred to 

[facility] and admitted to the ICU. He was started on a Cardizem drip

but was still tachycardic and was additionally placed on Digoxin and Metoprolol.

His heart rate has since been controlled. He had a 2D echo performed on 8/21 

that was significant for CHF with an EF of 40-45%. biatrial enlargement, and 

pulmonary HTN. Cardiology started him on Entresto and Jardiance. He had a 

transesophaeal echo performed on 8/22 prior to cardioversion that showed no 

thrombus formation. Subsequent cardioversion on 8/22 was unsuccessful and he was

put on an Amiodarone drip. Another cardioversion was attempted on 8/23 that was 

also unsuccessful. Cardiology planned to maintain on oral Amiodarone and 

Cardizem as well as Metoprolol, Entresto, Jardiance, and start on 5mg Eliquis 

following cardioversion due to CHADVASC score of 2. On day of discharge 8/23 his

heart rate was 74 but was still in atrial fibrillation but denies any associated

complaints of chest pain, dyspnea, palpitations. Cardiology wants to follow up 

with him in 1 month for third cardioversion attempt after full load of 

Amiodarone





TASHA MARIN DO 8/23/23 2009:


Supervisory-Addendum Brief


Verification & Attestation


Participated in pt care:  history, MDM, physical


Personally performed:  exam, history, MDM, supervision of care


Care discussed with:  Medical Student


Procedures:  n/a


Results interpretation:  Verified all documentation


Verification and Attestation of Medical Student E/M Service





A medical student performed and documented this service in my presence. I 

reviewed and verified all information documented by the medical student and made

modifications to such information, when appropriate. I personally performed the 

physical exam and medical decision making. 





 Tasha Marin, Aug 23, 2023,20:09











DIONTE GLEZ                Aug 23, 2023 13:02


TASHA MARIN DO                Aug 23, 2023 20:09

## 2023-08-23 NOTE — DISCHARGE SUMMARY
Diagnosis/Chief Complaint


Date of Admission


Aug 21, 2023 at 12:40


Date of Discharge





Discharge Date:  Aug 23, 2023


Discharge Diagnosis


-Atrial fibrillation with RVR New onset


   > Cardizem drip started yesterday but was still slightly tachycardic


   > Cardiology added digoxin and metoprolol


      > HR under control at 78 this morning


   > had 2D echo yesterday that showed EF of 40-45% with dilated LV, biatrial e

nlargement, pulmonary HTN with PA pressure 45-50 mmHg (likely hx of sleep apnea)


   > RALPH showed no signs of thrombus formation


   > cardioversion x2 unsuccessful


   > lopressor given, amiodarone drip planned


   > Continue anticoagulation


   > Consider possibly d/c cardizem drip due to normalized HR and hypotension





Presumed NEREIDA





Discharge Summary


Discharge Physical Examination


Allergies:  


Coded Allergies:  


     No Known Drug Allergies (Unverified , 12/10/15)


Vitals & I&Os





Vital Signs








  Date Time  Temp Pulse Resp B/P (MAP) Pulse Ox O2 Delivery O2 Flow Rate FiO2


 


8/23/23 11:00  73 17 125/96 (106) 95 Room Air  


 


8/23/23 04:00 36.4       


 


8/21/23 21:54        21











Hospital Course


Was the Problem List Reviewed?:  Yes


Masoud Fragoso is a 48 year old male with no significant past medical history. He

had been having dyspnea for the past week and was initially worked up by Dr. Molina. Over this past weekend he went to [place] to get labs and an EKG done and 

was found to be in atrial fibrillation with RVR and he was transferred to 

[facility] and admitted to the ICU. He was started on a Cardizem drip

but was still tachycardic and was additionally placed on Digoxin and Metoprolol.

His heart rate has since been controlled. He had a 2D echo performed on 8/21 

that was significant for CHF with an EF of 40-45%. biatrial enlargement, and 

pulmonary HTN. Cardiology started him on Entresto and Jardiance. He had a shirley

sesophaeal echo performed on 8/22 prior to cardioversion that showed no thrombus

formation. Subsequent cardioversion on 8/22 was unsuccessful and he was put on 

an Amiodarone drip. Another cardioversion was attempted on 8/23 that was also 

unsuccessful. Cardiology planned to maintain on oral Amiodarone and Cardizem as 

well as Metoprolol, Entresto, Jardiance, and start on 5mg Eliquis following 

cardioversion due to CHADVASC score of 2. On day of discharge 8/23 his heart 

rate was 74 but was still in atrial fibrillation but denies any associated 

complaints of chest pain, dyspnea, palpitations. Cardiology wants to follow up 

with him in 1 month for third cardioversion attempt after full load of 

Amiodarone











DIONTE GLEZ


Labs (last 24 hrs)


Laboratory Tests


8/22/23 03:33: 


White Blood Count 7.4, Red Blood Count 4.43, Hemoglobin 13.2L, Hematocrit 41, 

Mean Corpuscular Volume 91, Mean Corpuscular Hemoglobin 30, Mean Corpuscular 

Hemoglobin Concent 33, Red Cell Distribution Width 13.4, Platelet Count 196, 

Mean Platelet Volume 10.3, Immature Granulocyte % (Auto) 0, Neutrophils (%) 

(Auto) 61, Lymphocytes (%) (Auto) 28, Monocytes (%) (Auto) 10, Eosinophils (%) 

(Auto) 1, Basophils (%) (Auto) 1, Neutrophils # (Auto) 4.5, Lymphocytes # (Auto)

2.0, Monocytes # (Auto) 0.7, Eosinophils # (Auto) 0.1, Basophils # (Auto) 0.0, 

Immature Granulocyte # (Auto) 0.0, Sodium Level 141, Potassium Level 4.2, 

Chloride Level 108H, Carbon Dioxide Level 25, Anion Gap 8, Blood Urea Nitrogen 

15, Creatinine 0.95, Estimat Glomerular Filtration Rate 100, BUN/Creatinine 

Ratio 16, Glucose Level 96, Calcium Level 8.4L, Corrected Calcium 8.7, 

Phosphorus Level 3.6, Magnesium Level 1.9, Total Bilirubin 0.7, Aspartate Amino 

Transf (AST/SGOT) 22, Alanine Aminotransferase (ALT/SGPT) 40, Alkaline 

Phosphatase 61, Total Protein 5.8L, Albumin 3.6, Triglycerides Level 107, 

Cholesterol Level 148, LDL Cholesterol Direct 114, VLDL Cholesterol 21, HDL 

Cholesterol 36L, Thyroid Stimulating Hormone (TSH) 0.81


8/23/23 03:50: 


White Blood Count 8.2, Red Blood Count 4.81, Hemoglobin 14.4, Hematocrit 44, 

Mean Corpuscular Volume 91, Mean Corpuscular Hemoglobin 30, Mean Corpuscular 

Hemoglobin Concent 33, Red Cell Distribution Width 13.5, Platelet Count 180, 

Mean Platelet Volume 10.7, Immature Granulocyte % (Auto) 0, Neutrophils (%) 

(Auto) 64, Lymphocytes (%) (Auto) 24, Monocytes (%) (Auto) 11, Eosinophils (%) 

(Auto) 1, Basophils (%) (Auto) 1, Neutrophils # (Auto) 5.2, Lymphocytes # (Auto)

2.0, Monocytes # (Auto) 0.9, Eosinophils # (Auto) 0.1, Basophils # (Auto) 0.0, 

Immature Granulocyte # (Auto) 0.0, Sodium Level 142, Potassium Level 4.0, 

Chloride Level 109H, Carbon Dioxide Level 24, Anion Gap 9, Blood Urea Nitrogen 

14, Creatinine 1.01, Estimat Glomerular Filtration Rate 93, BUN/Creatinine Ratio

14, Glucose Level 93, Calcium Level 8.7, Corrected Calcium 8.9, Phosphorus Level

3.7, Magnesium Level 2.1, Total Bilirubin 0.7, Aspartate Amino Transf (AST/SGOT)

19, Alanine Aminotransferase (ALT/SGPT) 32, Alkaline Phosphatase 64, Total 

Protein 6.4, Albumin 3.8





Microbiology


8/21/23 MRSA Screen - Final, Complete


          MRSA not isolated





Pending Labs





Microbiology








 Date/Time


Source Procedure


Growth Status





 


 8/21/23 12:45


Nasal MRSA Screen - Final


MRSA not isolated Complete





Laboratory Tests


8/22/23 03:33: 


White Blood Count 7.4, Red Blood Count 4.43, Hemoglobin 13.2, Hematocrit 41, 

Mean Corpuscular Volume 91, Mean Corpuscular Hemoglobin 30, Mean Corpuscular 

Hemoglobin Concent 33, Red Cell Distribution Width 13.4, Platelet Count 196, 

Mean Platelet Volume 10.3, Immature Granulocyte % (Auto) 0, Neutrophils (%) 

(Auto) 61, Lymphocytes (%) (Auto) 28, Monocytes (%) (Auto) 10, Eosinophils (%) 

(Auto) 1, Basophils (%) (Auto) 1, Neutrophils # (Auto) 4.5, Lymphocytes # (Auto)

2.0, Monocytes # (Auto) 0.7, Eosinophils # (Auto) 0.1, Basophils # (Auto) 0.0, I

mmature Granulocyte # (Auto) 0.0, Sodium Level 141, Potassium Level 4.2, 

Chloride Level 108, Carbon Dioxide Level 25, Anion Gap 8, Blood Urea Nitrogen 

15, Creatinine 0.95, Estimat Glomerular Filtration Rate 100, BUN/Creatinine 

Ratio 16, Glucose Level 96, Calcium Level 8.4, Corrected Calcium 8.7, Phosphorus

Level 3.6, Magnesium Level 1.9, Total Bilirubin 0.7, Aspartate Amino Transf 

(AST/SGOT) 22, Alanine Aminotransferase (ALT/SGPT) 40, Alkaline Phosphatase 61, 

Total Protein 5.8, Albumin 3.6, Triglycerides Level 107, Cholesterol Level 148, 

LDL Cholesterol Direct 114, VLDL Cholesterol 21, HDL Cholesterol 36, Thyroid 

Stimulating Hormone (TSH) 0.81


8/23/23 03:50: 


White Blood Count 8.2, Red Blood Count 4.81, Hemoglobin 14.4, Hematocrit 44, Me

an Corpuscular Volume 91, Mean Corpuscular Hemoglobin 30, Mean Corpuscular 

Hemoglobin Concent 33, Red Cell Distribution Width 13.5, Platelet Count 180, 

Mean Platelet Volume 10.7, Immature Granulocyte % (Auto) 0, Neutrophils (%) 

(Auto) 64, Lymphocytes (%) (Auto) 24, Monocytes (%) (Auto) 11, Eosinophils (%) 

(Auto) 1, Basophils (%) (Auto) 1, Neutrophils # (Auto) 5.2, Lymphocytes # (Auto)

2.0, Monocytes # (Auto) 0.9, Eosinophils # (Auto) 0.1, Basophils # (Auto) 0.0, 

Immature Granulocyte # (Auto) 0.0, Sodium Level 142, Potassium Level 4.0, 

Chloride Level 109, Carbon Dioxide Level 24, Anion Gap 9, Blood Urea Nitrogen 

14, Creatinine 1.01, Estimat Glomerular Filtration Rate 93, BUN/Creatinine Ratio

14, Glucose Level 93, Calcium Level 8.7, Corrected Calcium 8.9, Phosphorus Level

3.7, Magnesium Level 2.1, Total Bilirubin 0.7, Aspartate Amino Transf (AST/SGOT)

19, Alanine Aminotransferase (ALT/SGPT) 32, Alkaline Phosphatase 64, Total 

Protein 6.4, Albumin 3.8








Discharge


Home Medications:





Active Scripts


Active


Amiodarone HCl 200 Mg Tablet 200 Mg PO UD


     Take 2 tabs twice daily for 2 weeks then


     Take one tab twice daily


Jardiance (Empagliflozin) 10 Mg Tablet 10 Mg PO DAILY


Entresto 24 mg-26 mg Tablet (Sacubitril/Valsartan) 24 Mg-26 Mg Tablet 1 Tab PO 

BID


Diltiazem 24Hr ER (Diltiazem HCl) 120 Mg Cap.er.24h 120 Mg PO DAILY


Metoprolol Tartrate 25 Mg Tablet 25 Mg PO BID


Eliquis (Apixaban) 5 Mg Tablet 5 Mg PO BID


Reported


Ventolin Hfa (Albuterol Sulfate) 90 Mcg Hfa.aer.ad 2 Puff INH Q6H PRN


Loratadine 10 Mg Tablet 10 Mg PO DAILY





Instructions to patient/family


Please see electronic discharge instructions given to patient.











PHILIP MARIN DO                Aug 23, 2023 11:29

## 2023-08-28 NOTE — PHYSICIAN QUERY CLARIFICATION
PQ-CHF Specificity


Admission Date: Aug 21, 2023 at 12:40 


Discharge Date:  Aug 23, 2023 at 13:45


The medical record reflects the following clinical scenario:





History/Risk Factors: 48 years old male patient admitted for atrial fibrillation

underwent cardioversion, chf was documented in medical record.





Clinical Findings: acute left ventricular systolic dysfunction, ejection 

fraction 40 - 45 % per echo.





Treatment: Beta blocker, Cardizem drip.





Question:  Can you further specify the acuity &/or type of CHF per the clinical 

indicators above?  


Please document a response in the Progress Notes or Discharge Summary.





   1.  Acuity:  Acute, Chronic or Acute on Chronic





   2.  Type:  Systolic, Diastolic or Systolic & Diastolic





   3.  Unspecified:  CHF cannot be further specified regarding type or acuity





   4.  Other, with explanation of clinical findings





   5.  Clinically undetermined, no explanation for clinical findings


PHYSICIAN RESPONSE


Acuity:  Acute


Type:  Systolic


In responding to this query, please exercise your independent professional 

judgment.  The purpose of this communication is to more accurately reflect the 

complexity of your patients condition. The fact that a question is asked does 

not imply that any particular answer is desired or expected.  





Thank you for your timely response to this clarification.      


   


Requestors name: [ ]   





Phone # [ ]








THIS PHYSICIAN QUERY FORM IS A PERMANENT PART OF THE MEDICAL RECORD











MEKHI CORDON                 Aug 28, 2023 04:43


PHILIP MARIN DO                Aug 28, 2023 05:28

## 2023-09-13 ENCOUNTER — HOSPITAL ENCOUNTER (OUTPATIENT)
Dept: HOSPITAL 75 - CATH | Age: 48
Discharge: HOME | End: 2023-09-13
Attending: INTERNAL MEDICINE
Payer: COMMERCIAL

## 2023-09-13 VITALS — DIASTOLIC BLOOD PRESSURE: 76 MMHG | SYSTOLIC BLOOD PRESSURE: 108 MMHG

## 2023-09-13 VITALS — DIASTOLIC BLOOD PRESSURE: 87 MMHG | SYSTOLIC BLOOD PRESSURE: 122 MMHG

## 2023-09-13 VITALS — HEIGHT: 74.02 IN | WEIGHT: 219.58 LBS | BODY MASS INDEX: 28.18 KG/M2

## 2023-09-13 VITALS — SYSTOLIC BLOOD PRESSURE: 119 MMHG | DIASTOLIC BLOOD PRESSURE: 96 MMHG

## 2023-09-13 VITALS — SYSTOLIC BLOOD PRESSURE: 112 MMHG | DIASTOLIC BLOOD PRESSURE: 79 MMHG

## 2023-09-13 VITALS — DIASTOLIC BLOOD PRESSURE: 86 MMHG | SYSTOLIC BLOOD PRESSURE: 130 MMHG

## 2023-09-13 DIAGNOSIS — I48.0: Primary | ICD-10-CM

## 2023-09-13 DIAGNOSIS — Z79.899: ICD-10-CM

## 2023-09-13 DIAGNOSIS — I27.20: ICD-10-CM

## 2023-09-13 DIAGNOSIS — I11.0: ICD-10-CM

## 2023-09-13 DIAGNOSIS — Z79.01: ICD-10-CM

## 2023-09-13 DIAGNOSIS — I50.21: ICD-10-CM

## 2023-09-13 DIAGNOSIS — I08.1: ICD-10-CM

## 2023-09-13 DIAGNOSIS — I48.19: ICD-10-CM

## 2023-09-13 PROCEDURE — 92960 CARDIOVERSION ELECTRIC EXT: CPT

## 2023-09-13 PROCEDURE — 93005 ELECTROCARDIOGRAM TRACING: CPT

## 2023-09-13 NOTE — CARDIOVERSION
Cardioversion


PROCEDURE PHYSICIAN:   Moira Schwarz 


 


DATE OF PROCEDURE:  9/13/23 


 


DIRECT EXTERNAL ELECTRICAL CARDIOVERSION:  


 


Indications:


Atrial Fibrillation with rapid ventricular rate





Preoperative diagnoses: 


Atrial Fibrillation with rapid ventricular rate


 


Postoperative diagnosis: 


Sinus rhythm, Successful Electrical Cardioversion





History:


48-year-old gentleman with atrial fibrillation, persistent, failed multiple 

attempt for cardioversion, he was loaded with IV amiodarone and attempted 

cardioversion was done without success then he continued with oral amiodarone 

loading dose and I scheduled him for another attempt.





Anesthesia:


By Anesthesia services





Complications: 


None





Specimen: 


None





Contrast: 0





Flouroscopy: none





Procedure Details:


 


The patient was brought the cath lab after informed consent was taken, all the 

risks and complications were explained including the risk of stroke. Electrical 

cardioversion was carried out with anesthesia support with propofol. 200 joules 

of synchronized shock was  delivered through external patches which promptly 

restored sinus rhythm. The patient tolerated the procedure well.  





Conclusions:


Successful electrical cardioversion terminating atrial fibrillation





Final Diagnosis:


Paroxysmal atrial fibrillation


Palpitation











MOIRA SCHWARZ MD              Sep 13, 2023 11:48

## 2023-09-13 NOTE — CARDIAC PROCEDURE NOTE-CS/ASA
Pre-Procedure Note


Pre-Op Procedure Note


Date of Available H&P:  Sep 5, 2023


Date H&P Reviewed:  Sep 13, 2023


Time H&P Reviewed:  11:43


History & Physical:  H&P Reviewed, Patient Examed, No changes noted


Pre-Operative Diagnosis:  Atrial fibrillation





Moderate Sedation PreProcedure


Time


11:43





ASA Score


3














Airway 


 


Lungs 


 


Heart 


 


 ASA score


 


 ASA 1: a normal healthy patient


 


 ASA 2:  a patient with a mild systemic disease (mid diabetes, controlled 

hypertension, obesity 


 


 ASA 3:  a patient with a severe systemic disease that limits activity  (angina,

COPD, prior Myocardial infarction)


 


 ASA 4:  a patient with an incapacitating disease that is a constant threat to 

life (CHF, renal failure)


 


 ASA 5:  a moribund patient not expected to survive 24 hrs.  (ruptured aneurysm)


 


 ASA 6:  a declared brain-dead patient whose organs are being harvested.


 


 For emergent operations, add the letter E after the classification











Mallampati Classification


Grade 3





Sedation Plan


Analgesia, Amnesia, Plan communicated to team members, Discussed options with 

patient/fam, Discussed risks with patient/fam


The patient is an appropriate candidate to undergo the planned procedure, 

sedation, and anesthesia.





The patient immediately re-assessed prior to indication.











MOIRA SUAREZ MD              Sep 13, 2023 11:43 none

## 2023-09-13 NOTE — ANESTHESIA-GENERAL POST-OP
MAC


Patient Condition


Mental Status/LOC:  Same as Preop


Cardiovascular:  Satisfactory


Nausea/Vomiting:  Absent


Respiratory:  Satisfactory


Pain:  Controlled


Complications:  Absent





Post Op Complications


Complications


None





Follow Up Care/Instructions


Patient Instructions


None needed.





Anesthesiology Discharge Order


Discharge Order


Patient is doing well, no complaints, stable vital signs, no apparent adverse 

anesthesia problems.   


No complications reported per nursing.











MICHAEL ROBLEDO CRNA           Sep 13, 2023 11:54